# Patient Record
Sex: FEMALE | Race: WHITE
[De-identification: names, ages, dates, MRNs, and addresses within clinical notes are randomized per-mention and may not be internally consistent; named-entity substitution may affect disease eponyms.]

---

## 2023-02-05 ENCOUNTER — HOSPITAL ENCOUNTER (OUTPATIENT)
Dept: HOSPITAL 33 - OB | Age: 28
Setting detail: OBSERVATION
LOS: 1 days | Discharge: HOME | End: 2023-02-06
Attending: OBSTETRICS & GYNECOLOGY | Admitting: OBSTETRICS & GYNECOLOGY
Payer: COMMERCIAL

## 2023-02-05 DIAGNOSIS — Z34.83: Primary | ICD-10-CM

## 2023-02-05 DIAGNOSIS — Z3A.31: ICD-10-CM

## 2023-02-05 PROCEDURE — G0378 HOSPITAL OBSERVATION PER HR: HCPCS

## 2023-02-06 VITALS — HEART RATE: 83 BPM | DIASTOLIC BLOOD PRESSURE: 65 MMHG | SYSTOLIC BLOOD PRESSURE: 137 MMHG

## 2023-02-06 VITALS — OXYGEN SATURATION: 99 %

## 2023-03-31 ENCOUNTER — HOSPITAL ENCOUNTER (INPATIENT)
Dept: HOSPITAL 33 - OB | Age: 28
LOS: 1 days | Discharge: HOME | End: 2023-04-01
Attending: OBSTETRICS & GYNECOLOGY | Admitting: OBSTETRICS & GYNECOLOGY
Payer: COMMERCIAL

## 2023-03-31 DIAGNOSIS — O34.211: Primary | ICD-10-CM

## 2023-03-31 DIAGNOSIS — Z20.828: ICD-10-CM

## 2023-03-31 DIAGNOSIS — Z3A.39: ICD-10-CM

## 2023-03-31 LAB
ABO GROUP BLD: (no result)
AMPHETAMINES UR QL: NEGATIVE
APTT PPP: 26.3 SECONDS (ref 25.1–36.5)
BACTERIA UR CULT: NO
BARBITURATES UR QL: NEGATIVE
BENZODIAZ UR QL SCN: NEGATIVE
COCAINE UR QL SCN: NEGATIVE
HCT VFR BLD AUTO: 33.2 % (ref 35–47)
HGB BLD-MCNC: 11.2 G/DL (ref 12–16)
INR PPP: 0.89 (ref 0.8–3)
MCH RBC QN AUTO: 29.2 PG (ref 26–32)
MCHC RBC AUTO-ENTMCNC: 33.7 G/DL (ref 32–36)
METHADONE UR QL: NEGATIVE
OPIATES UR QL: NEGATIVE
PCP UR QL CFM>20 NG/ML: NEGATIVE
PLATELET # BLD AUTO: 153 X10^3/UL (ref 150–450)
PROTHROMBIN TIME: 9.8 SECONDS (ref 9.4–12.5)
RBC # BLD AUTO: 3.84 X10^6/UL (ref 4.1–5.4)
RBC # URNS HPF: (no result) /HPF (ref 0–5)
RBC # URNS HPF: (no result) /HPF (ref 0–5)
RH BLD: POSITIVE
THC UR QL SCN: NEGATIVE
WBC # BLD AUTO: 6.1 X10^3/UL (ref 4–10.5)
WBC URNS QL MICRO: (no result) /HPF (ref 0–5)
WBC URNS QL MICRO: (no result) /HPF (ref 0–5)

## 2023-03-31 PROCEDURE — 85730 THROMBOPLASTIN TIME PARTIAL: CPT

## 2023-03-31 PROCEDURE — 81002 URINALYSIS NONAUTO W/O SCOPE: CPT

## 2023-03-31 PROCEDURE — 36415 COLL VENOUS BLD VENIPUNCTURE: CPT

## 2023-03-31 PROCEDURE — 94799 UNLISTED PULMONARY SVC/PX: CPT

## 2023-03-31 PROCEDURE — 85610 PROTHROMBIN TIME: CPT

## 2023-03-31 PROCEDURE — 86850 RBC ANTIBODY SCREEN: CPT

## 2023-03-31 PROCEDURE — 86900 BLOOD TYPING SEROLOGIC ABO: CPT

## 2023-03-31 PROCEDURE — 87086 URINE CULTURE/COLONY COUNT: CPT

## 2023-03-31 PROCEDURE — 90715 TDAP VACCINE 7 YRS/> IM: CPT

## 2023-03-31 PROCEDURE — 80307 DRUG TEST PRSMV CHEM ANLYZR: CPT

## 2023-03-31 PROCEDURE — 85027 COMPLETE CBC AUTOMATED: CPT

## 2023-03-31 PROCEDURE — 96372 THER/PROPH/DIAG INJ SC/IM: CPT

## 2023-03-31 PROCEDURE — 76937 US GUIDE VASCULAR ACCESS: CPT

## 2023-03-31 PROCEDURE — 85025 COMPLETE CBC W/AUTO DIFF WBC: CPT

## 2023-03-31 PROCEDURE — 81001 URINALYSIS AUTO W/SCOPE: CPT

## 2023-03-31 PROCEDURE — 86901 BLOOD TYPING SEROLOGIC RH(D): CPT

## 2023-03-31 PROCEDURE — 64488 TAP BLOCK BI INJECTION: CPT

## 2023-03-31 PROCEDURE — 59426 ANTEPARTUM CARE ONLY: CPT

## 2023-03-31 PROCEDURE — 87340 HEPATITIS B SURFACE AG IA: CPT

## 2023-03-31 RX ADMIN — CLINDAMYCIN IN 5 PERCENT DEXTROSE SCH MLS/HR: 18 INJECTION, SOLUTION INTRAVENOUS at 18:17

## 2023-03-31 RX ADMIN — DOCUSATE SODIUM SCH MG: 100 CAPSULE, LIQUID FILLED ORAL at 22:23

## 2023-04-01 VITALS — HEART RATE: 119 BPM | SYSTOLIC BLOOD PRESSURE: 124 MMHG | OXYGEN SATURATION: 96 % | DIASTOLIC BLOOD PRESSURE: 78 MMHG

## 2023-04-01 LAB
BASOPHILS # BLD AUTO: 0.03 X10^3/UL (ref 0–0.4)
BASOPHILS NFR BLD AUTO: 0.3 % (ref 0–0.4)
EOSINOPHIL # BLD AUTO: 0.04 X10^3/UL (ref 0–0.5)
HCT VFR BLD AUTO: 33 % (ref 35–47)
HGB BLD-MCNC: 11.1 G/DL (ref 12–16)
IMM GRANULOCYTES # BLD: 0.03 X10^3U/L (ref 0–0.03)
IMM GRANULOCYTES NFR BLD: 0.3 % (ref 0–0.4)
LYMPHOCYTES # SPEC AUTO: 0.96 X10^3/UL (ref 1–4.6)
MCH RBC QN AUTO: 29.4 PG (ref 26–32)
MCHC RBC AUTO-ENTMCNC: 33.6 G/DL (ref 32–36)
MONOCYTES # BLD AUTO: 0.6 X10^3/UL (ref 0–1.3)
NRBC # BLD AUTO: 0 X10^3U/L (ref 0–0.01)
NRBC BLD AUTO-RTO: 0 % (ref 0–0.1)
PLATELET # BLD AUTO: 156 X10^3/UL (ref 150–450)
RBC # BLD AUTO: 3.77 X10^6/UL (ref 4.1–5.4)
WBC # BLD AUTO: 8.7 X10^3/UL (ref 4–10.5)

## 2023-04-01 RX ADMIN — HYDROCODONE BITARTRATE AND ACETAMINOPHEN PRN TAB: 5; 325 TABLET ORAL at 17:23

## 2023-04-01 RX ADMIN — CLINDAMYCIN IN 5 PERCENT DEXTROSE SCH MLS/HR: 18 INJECTION, SOLUTION INTRAVENOUS at 02:12

## 2023-04-01 RX ADMIN — HYDROCODONE BITARTRATE AND ACETAMINOPHEN PRN TAB: 5; 325 TABLET ORAL at 13:21

## 2023-04-01 RX ADMIN — DOCUSATE SODIUM SCH MG: 100 CAPSULE, LIQUID FILLED ORAL at 09:18

## 2023-04-01 NOTE — PCM.NOTE
Date and Time: 23  1642





Subjective Assessment: 





pod 1 sp csection


pt resting in bed and doing well able to ambulate and tolerate diet


vss afebrile


abd; soft


uterus firm


lochia; mild





a/p sp csection pod 1


  doing well


  desires to be discharged today due to baby being transferred





OBJECTIVE DATA


Vital Signs: 


                               Vital Signs - 24 hr











  Temp Pulse Resp BP Pulse Ox


 


 23 15:00  98.4 F  108 H  18  121/56  98


 


 23 10:00      97


 


 23 09:32  97.9 F  93 H  16  117/66  97


 


 23 09:00      97


 


 23 08:00      98


 


 23 07:00      100


 


 23 06:00  98.3 F  93 H  18  117/63  100


 


 23 05:00      100


 


 23 04:00      100


 


 23 03:00      100


 


 23 00:08  98.4 F  91 H  16  117/63  98


 


 23 00:00      98


 


 23 23:30      98


 


 23 22:00      98


 


 23 21:00      98


 


 23 20:00  98.6 F  96 H  18  110/53  99


 


 23 18:54      97


 


 23 18:00      99


 


 23 17:00      100








                        Pain Assessment - Last Documented











Pain Intensity [Bilateral      1





Lower Distal]                  


 


Pain Intensity                 7


 


Pain Scale Used                0-10 Pain Scale











Intake and Output: 


                                 Intake & Output











 23





 11:59 11:59 11:59 11:59


 


Intake Total   1620 


 


Output Total   2350 


 


Balance   -730 


 


Weight  129.274 kg  











Lab Results: 


                            Lab Results-Last 24 Hours











  23 Range/Units





  06:01 06:05 


 


WBC   8.7  (4.0-10.5)  x10^3/uL


 


RBC   3.77 L  (4.1-5.4)  x10^6/uL


 


Hgb   11.1 L  (12.0-16.0)  g/dL


 


Hct   33.0 L  (35-47)  %


 


MCV   87.5  ()  fL


 


MCH   29.4  (26-32)  pg


 


MCHC   33.6  (32-36)  g/dL


 


RDW   15.4 H  (11.5-14.0)  %


 


Plt Count   156  (150-450)  x10^3/uL


 


MPV   9.9  (7.5-11.0)  fL


 


Gran %   81.0 H  (36.0-66.0)  %


 


Immature Gran % (Auto)   0.3  (0.00-0.4)  %


 


Nucleat RBC Rel Count   0.0  (0.00-0.1)  %


 


Eos # (Auto)   0.04  (0-0.5)  x10^3/uL


 


Immature Gran # (Auto)   0.03  (0.00-0.03)  x10^3u/L


 


Absolute Lymphs (auto)   0.96 L  (1.0-4.6)  x10^3/uL


 


Absolute Monos (auto)   0.60  (0.0-1.3)  x10^3/uL


 


Absolute Nucleated RBC   0.00  (0.00-0.01)  x10^3u/L


 


Lymphocytes %   11.0 L  (24.0-44.0)  %


 


Monocytes %   6.9  (0.0-12.0)  %


 


Eosinophils %   0.5  (0.00-5.0)  %


 


Basophils %   0.3  (0.0-0.4)  %


 


Absolute Granulocytes   7.08 H  (1.4-6.9)  x10^3/uL


 


Basophils #   0.03  (0-0.4)  x10^3/uL


 


Hep Bs Antigen  Negative   (Negative)  














Assessment/Plan


(1) S/P repeat low transverse 


Current Visit: Yes   Status: Acute   Code(s): Z98.891 - HISTORY OF UTERINE SCAR 

FROM PREVIOUS SURGERY

## 2023-04-01 NOTE — PCM.DS
Discharge Summary


Date of Admission: 


23 05:00





Admitting Physician: 


JAMIE GUARDADO DO





Consults: 





                                Consults on Case





23 05:00


Notify Physician OF ADMISSION 





23 08:00


Notify  Anesthesia Provider ROUTINE 





23 10:00


Notify  Anesthesia Provider PRN 





23 10:28


 Navigation ONCE 











Primary Care Provider: 


IFRAH RETANA








Allergies


Allergies





Penicillins Allergy (Severe, Verified 23 06:26)


   Tightness of Throat











Hospital Summary





- Hospital Course


Hospital Course: 





pt admitted o  for scheduled repeat csection and underwent procedure 

without complication and delivered live baby girl.  pt did well postoperatively 

was able to ambulate and tolerate diet.  pts baby was transferred to [place] today

therefore pt desires to be discharged.  pt had stable hgb level at 11 and was 

given rx for norco for pain management. all questions answered to her 

satisfaction.  advised to fu next friday for incision check.





- Vitals & Intake/Output


Vital Signs: 





                                   Vital Signs











Temperature  98.4 F   23 15:00


 


Pulse Rate  108 H  23 15:00


 


Respiratory Rate  18   23 15:00


 


Blood Pressure  121/56   23 15:00


 


O2 Sat by Pulse Oximetry  98   23 15:00











Intake & Output: 





                                 Intake & Output











 23





 11:59 11:59 11:59 11:59


 


Intake Total   1620 


 


Output Total   2350 


 


Balance   -730 


 


Weight  129.274 kg  














- Lab


Result Diagrams: 


                                 23 06:05





Lab Results-Last 24 Hrs: 





                            Lab Results-Last 24 Hours











  23 Range/Units





  06:01 06:05 


 


WBC   8.7  (4.0-10.5)  x10^3/uL


 


RBC   3.77 L  (4.1-5.4)  x10^6/uL


 


Hgb   11.1 L  (12.0-16.0)  g/dL


 


Hct   33.0 L  (35-47)  %


 


MCV   87.5  ()  fL


 


MCH   29.4  (26-32)  pg


 


MCHC   33.6  (32-36)  g/dL


 


RDW   15.4 H  (11.5-14.0)  %


 


Plt Count   156  (150-450)  x10^3/uL


 


MPV   9.9  (7.5-11.0)  fL


 


Gran %   81.0 H  (36.0-66.0)  %


 


Immature Gran % (Auto)   0.3  (0.00-0.4)  %


 


Nucleat RBC Rel Count   0.0  (0.00-0.1)  %


 


Eos # (Auto)   0.04  (0-0.5)  x10^3/uL


 


Immature Gran # (Auto)   0.03  (0.00-0.03)  x10^3u/L


 


Absolute Lymphs (auto)   0.96 L  (1.0-4.6)  x10^3/uL


 


Absolute Monos (auto)   0.60  (0.0-1.3)  x10^3/uL


 


Absolute Nucleated RBC   0.00  (0.00-0.01)  x10^3u/L


 


Lymphocytes %   11.0 L  (24.0-44.0)  %


 


Monocytes %   6.9  (0.0-12.0)  %


 


Eosinophils %   0.5  (0.00-5.0)  %


 


Basophils %   0.3  (0.0-0.4)  %


 


Absolute Granulocytes   7.08 H  (1.4-6.9)  x10^3/uL


 


Basophils #   0.03  (0-0.4)  x10^3/uL


 


Hep Bs Antigen  Negative   (Negative)  














- Procedures and Test


Procedures and Tests throughout Hospitalization: 





                            Therapy Orders & Screens





23 09:16


[facility] ROUTINE 


   Comment: 


   Diagnosis: OSMAN Repeat C/S














- Discharge


Disposition: Home, Self-Care


Condition: Stable


Prescriptions: 


New


   Hydrocodone/Acetaminophen [Hydrocodone-Acetamin 5-325 mg***] 1 tab PO Q6HPRN 

PRN #20 tablet MDD 4


     PRN Reason: Pain





No Action


   Sertraline HCl 50 mg** [Zoloft 50 mg Tablet**] 50 mg PO DAILY


   Prenatal Vit No.179/Iron/Folic [Prenatal Tablet] 1 tab PO HS


Follow up with: 


IFRAH RETANA [Primary Care Provider] - 


JAMIE GUARDADO DO [ACTIVE STAFF] - 1 Week (should fu in office next friday)

## 2023-04-03 NOTE — OP
SURGERY DATE/TIME:  2023  0821  



PREOPERATIVE DIAGNOSIS:  Intrauterine pregnancy at 39 weeks gestation with previous 
 section for repeat  section. 



POSTOPERATIVE DIAGNOSIS:   Intrauterine pregnancy at 39 weeks gestation with previous 
 section for repeat  section with lower uterine segment with anterior 
placenta.  



PROCEDURES:   Repeat  section, low flap transverse uterine incision, Pfannenstiel 
skin incision. 



SURGEON:        Michelet Medina D.O.



ASSISTANT:      Quyen Beaver and Alsysa Carrera, surgical tech. 



ANESTHESIA:    Spinal. 



ESTIMATED BLOOD LOSS:  554 cc. 



COMPLICATIONS:  None.



INDICATIONS:  The risks, benefits, indications and alternatives of the procedure were 
reviewed with the patient prior to procedure. The patient understood the risk of 
infection, bleeding, bowel injury, bladder injury, ureteral perforation, pelvic infection, 
thromboembolic disorder associated with this procedure and desires to have this surgery as 
a possible means to alleviate her current medical condition. 

      

DESCRIPTION OF PROCEDURE AND FINDINGS:  At this point the patient is taken to the 
operating room where her spinal anesthesia was found to adequate. She was then prepared 
and draped in normal sterile fashion in the dorsal supine position with a leftward tilt. A 
Pfannenstiel skin incision is made with a scalpel and carried through to the underlying 
layer of the fascia with a Bovie. The fascia was then incised in the midline and the 
incision extended laterally with Araujo scissors. The superior aspect of the fascial 
incision were then grasped Kocher clamps elevated and the underlying rectus muscles 
dissected off bluntly. Attention is then turned to the inferior aspect of this incision 
which in similar fashion was grasped, tented up with Kocher clamps and the rectus muscles 
dissected off bluntly. The rectus muscles were then  in the midline and the 
peritoneum identified, tented up and entered sharply with Metzenbaum scissors. The 
peritoneal incision was then extended superiorly and inferiorly with good visualization of 
the bladder. The bladder blade was then inserted and the vesicouterine peritoneum 
identified, grasped with pickups and entered sharply with Metzenbaum scissors. This 
incision was then extended laterally and bladder flap created digitally. The bladder blade 
was then reinserted in the lower uterine segment and excised in transverse fashion with a 
scalpel. The uterine incision was then extended laterally with bandage scissors. The 
bladder blade was then removed and the placenta was noted to be anteriorly however 
entrance through the placenta was obtained and the infants head was delivered 
atraumatically. The nose and mouth were suctioned with DeLee suction and the cord clamped 
and cut. The infant was then handed off to the awaiting nurses. The lower uterine segment 
appeared to be thin at the time of entry. At this point the placenta was then removed and 
the uterus exteriorized and cleared of all clots and debris. The uterine incision was 
repaired with 1-0 chromic in running locked fashion. A second layer of the same suture was 
used to obtain excellent hemostasis. At this point the uterus is then returned to the 
abdomen. The gutters were cleared of all clots and the peritoneal muscles were closed in 
interrupted fashion using 2-0 chromic suture. The fascia was re-approximated with 0 Vicryl 
in running fashion. The subcutaneous layer was closed with 3-0 Vicryl suture. The skin was 
closed with absorbable staples called INSORB. The patient tolerated the procedure well.  
Sponge, lap, needle and instrument counts were correct x2.  The patient was then taken to 
the recovery room in stable condition. The patient delivered a live baby girl at 0957 
without complication.

## 2023-08-29 ENCOUNTER — HOSPITAL ENCOUNTER (EMERGENCY)
Dept: HOSPITAL 33 - ED | Age: 28
Discharge: HOME | End: 2023-08-29
Payer: COMMERCIAL

## 2023-08-29 VITALS — OXYGEN SATURATION: 98 %

## 2023-08-29 VITALS — SYSTOLIC BLOOD PRESSURE: 133 MMHG | DIASTOLIC BLOOD PRESSURE: 84 MMHG | HEART RATE: 92 BPM

## 2023-08-29 VITALS — RESPIRATION RATE: 24 BRPM | TEMPERATURE: 98.2 F

## 2023-08-29 DIAGNOSIS — R10.2: Primary | ICD-10-CM

## 2023-08-29 LAB
ALBUMIN SERPL-MCNC: 4.3 G/DL (ref 3.5–5)
ALP SERPL-CCNC: 57 U/L (ref 38–126)
ALT SERPL-CCNC: 18 U/L (ref 0–35)
ANION GAP SERPL CALC-SCNC: 13.6 MEQ/L (ref 5–15)
AST SERPL QL: 22 U/L (ref 14–36)
BACTERIA UR CULT: NO
BASOPHILS # BLD AUTO: 0.03 X10^3/UL (ref 0–0.4)
BASOPHILS NFR BLD AUTO: 0.5 % (ref 0–0.4)
BILIRUB BLD-MCNC: 0.4 MG/DL (ref 0.2–1.3)
BUN SERPL-MCNC: 8 MG/DL (ref 7–17)
C TRACH DNA SPEC QL NAA+PROBE: NOT DETECTED
CALCIUM SPEC-MCNC: 9.1 MG/DL (ref 8.4–10.2)
CANDIDA DNA SPEC QL NAA+PROBE: NOT DETECTED
CHLORIDE SERPL-SCNC: 105 MMOL/L (ref 98–107)
CO2 SERPL-SCNC: 25 MMOL/L (ref 22–30)
CREAT SERPL-MCNC: 0.56 MG/DL (ref 0.52–1.04)
EOSINOPHIL # BLD AUTO: 0.09 X10^3/UL (ref 0–0.5)
GFR SERPLBLD BASED ON 1.73 SQ M-ARVRAT: > 60 ML/MIN
GLUCOSE SERPL-MCNC: 97 MG/DL (ref 74–106)
HCG UR QL: NEGATIVE
HCT VFR BLD AUTO: 37.3 % (ref 35–47)
HGB BLD-MCNC: 12.4 G/DL (ref 12–16)
IMM GRANULOCYTES # BLD: 0.02 X10^3U/L (ref 0–0.03)
IMM GRANULOCYTES NFR BLD: 0.3 % (ref 0–0.4)
LYMPHOCYTES # SPEC AUTO: 1.51 X10^3/UL (ref 1–4.6)
MCH RBC QN AUTO: 27.5 PG (ref 26–32)
MCHC RBC AUTO-ENTMCNC: 33.2 G/DL (ref 32–36)
MONOCYTES # BLD AUTO: 0.34 X10^3/UL (ref 0–1.3)
NRBC # BLD AUTO: 0 X10^3U/L (ref 0–0.01)
NRBC BLD AUTO-RTO: 0 % (ref 0–0.1)
PLATELET # BLD AUTO: 206 X10^3/UL (ref 150–450)
POTASSIUM SERPLBLD-SCNC: 3.6 MMOL/L (ref 3.5–5.1)
PROT SERPL-MCNC: 7.5 G/DL (ref 6.3–8.2)
RBC # BLD AUTO: 4.51 X10^6/UL (ref 4.1–5.4)
RBC # URNS HPF: (no result) /HPF (ref 0–5)
SODIUM SERPL-SCNC: 140 MMOL/L (ref 137–145)
WBC # BLD AUTO: 6.3 X10^3/UL (ref 4–10.5)
WBC URNS QL MICRO: (no result) /HPF (ref 0–5)

## 2023-08-29 PROCEDURE — 36415 COLL VENOUS BLD VENIPUNCTURE: CPT

## 2023-08-29 PROCEDURE — 87801 DETECT AGNT MULT DNA AMPLI: CPT

## 2023-08-29 PROCEDURE — 87491 CHLMYD TRACH DNA AMP PROBE: CPT

## 2023-08-29 PROCEDURE — 36000 PLACE NEEDLE IN VEIN: CPT

## 2023-08-29 PROCEDURE — 87591 N.GONORRHOEAE DNA AMP PROB: CPT

## 2023-08-29 PROCEDURE — 99283 EMERGENCY DEPT VISIT LOW MDM: CPT

## 2023-08-29 PROCEDURE — 87481 CANDIDA DNA AMP PROBE: CPT

## 2023-08-29 PROCEDURE — 87661 TRICHOMONAS VAGINALIS AMPLIF: CPT

## 2023-08-29 PROCEDURE — 81001 URINALYSIS AUTO W/SCOPE: CPT

## 2023-08-29 PROCEDURE — 81025 URINE PREGNANCY TEST: CPT

## 2023-08-29 PROCEDURE — 85025 COMPLETE CBC W/AUTO DIFF WBC: CPT

## 2023-08-29 PROCEDURE — 80053 COMPREHEN METABOLIC PANEL: CPT

## 2023-08-29 NOTE — ERPHSYRPT
- History of Present Illness


Time Seen by Provider: 23 18:58


Source: patient


Exam Limitations: no limitations


Physician History: 


Patient 27-year-old female presents to our ED for evaluation of pelvic pain.  

Patient states that she has been experiencing pelvic pain since May 18 when her 

IUD was placed.  Patient made her OB/GYN doctor aware and is currently scheduled

to follow-up with him regarding this discomfort that has been ongoing since May 

18.  Patient states now however she feels a pressure sensation in addition to 

the pain that she has been experiencing since the IUD was placed.  No vaginal 

discharge.  No trauma.  No fever.  No urinary symptomology.  Symptoms are mild 

to moderate in intensity.  No specific worsening improving factors.  No 

associated symptomology otherwise.  Patient is currently 5 months postpartum.  

Patient otherwise feels well.  She voices no other complaints or concerns at 

this time.














Portions of this note were created with voice recognition technology.  There may

be grammatical, spelling, punctuation or sound alike errors





Timing/Duration: day(s) (2 days)


Severity: moderate


Modifying Factors: Improves With: nothing


Associated Symptoms: denies symptoms


Allergies/Adverse Reactions: 








Penicillins Allergy (Severe, Verified 23 18:46)


   Tightness of Throat





Home Medications: 








No Reportable Medications [No Reported Medications]  23 [History]








Travel Risk





- Vaccine Status


Have you recieved a Covid-19 vaccination: Yes


: Partnered





- Review of Systems


Constitutional: No Symptoms, No Fever, No Chills


Eyes: No Symptoms


Ears, Nose, & Throat: No Symptoms


Respiratory: No Symptoms, No Cough, No Dyspnea


Cardiac: No Symptoms, No Chest Pain, No Edema, No Syncope


Abdominal/Gastrointestinal: No Symptoms, No Abdominal Pain, No Nausea, No 

Vomiting, No Diarrhea


Genitourinary Symptoms: No Symptoms, No Dysuria


Musculoskeletal: No Symptoms, No Back Pain, No Neck Pain


Skin: No Symptoms, No Rash


Neurological: No Symptoms, No Dizziness, No Focal Weakness, No Sensory Changes


Psychological: No Symptoms


Endocrine: No Symptoms


Hematologic/Lymphatic: No Symptoms


Immunological/Allergic: No Symptoms


All Other Systems: Reviewed and Negative





- Past Medical History


Pertinent Past Medical History: Yes


Neurological History: No Pertinent History


ENT History: No Pertinent History


Cardiac History: No Pertinent History


Respiratory History: No Pertinent History


Endocrine Medical History: No Pertinent History


Musculoskeletal History: Fractures


GI Medical History: No Pertinent History


 History: No Pertinent History


Psycho-Social History: Anxiety, Depression


Female Reproductive Disorders: No Pertinent History





- Past Surgical History


Past Surgical History: Yes


Neuro Surgical History: No Pertinent History


Cardiac: No Pertinent History


Respiratory: No Pertinent History


Gastrointestinal: Cholecystectomy


Genitourinary: No Pertinent History


Musculoskeletal: No Pertinent History


Female Surgical History: Dilation & Curettage,  Section


Other Surgical History: Primary c/s 2019





- Social History


Smoking Status: Never smoker


Exposure to second hand smoke: No


Drug Use: none





- Nursing Vital Signs


Nursing Vital Signs: 


                               Initial Vital Signs











Temperature  98.2 F   23 18:37


 


Pulse Rate  102 H  23 18:37


 


Respiratory Rate  24   23 18:37


 


Blood Pressure  142/89   23 18:37


 


O2 Sat by Pulse Oximetry  98   23 18:37








                                   Pain Scale











Pain Intensity                 4

















- Physical Exam


General Appearance: no apparent distress, alert


Eye Exam: PERRL/EOMI, eyes nml inspection


Ears, Nose, Throat Exam: normal ENT inspection, TMs normal, pharynx normal, 

moist mucous membranes


Neck Exam: normal inspection, non-tender, supple, full range of motion


Respiratory Exam: normal breath sounds, lungs clear, airway intact, No 

respiratory distress


Cardiovascular Exam: regular rate/rhythm, normal heart sounds, normal peripheral

 pulses


Gastrointestinal/Abdomen Exam: soft, normal bowel sounds, No tenderness, No mass


Pelvic Exam: normal external exam, No adnexal tenderness, No adnexal mass, No 

cervical motion tenderness, No vaginal bleeding, No vaginal discharge


Back Exam: normal inspection, normal range of motion, No CVA tenderness, No 

vertebral tenderness


Extremity Exam: normal inspection, normal range of motion, pelvis stable


Neurologic Exam: alert, oriented x 3, cooperative, normal mood/affect, nml 

cerebellar function, nml station & gait, sensation nml, No motor deficits


Skin Exam: normal color, warm, dry, No rash


Lymphatic Exam: No adenopathy


**SpO2 Interpretation**: normal


SpO2: 98


O2 Delivery: Room Air





- Course


Nursing assessment & vital signs reviewed: Yes


Ordered Tests: 


                               Active Orders 24 hr











 Category Date Time Status


 


 IV Insertion STAT Care  23 18:55 Active


 


 CBC W DIFF Stat Lab  23 19:25 Completed


 


 CMP Stat Lab  23 19:25 Completed


 


 HCG QUALITATIVE, URINE Stat Lab  23 19:25 Completed


 


 UA W/RFX UR CULTURE Stat Lab  23 19:25 Completed











Lab/Rad Data: 


                           Laboratory Result Diagrams





                                 23 19:25 





                                 23 19:25 





                               Laboratory Results











  23 Range/Units





  20:00 19:25 19:25 


 


WBC     (4.0-10.5)  x10^3/uL


 


RBC     (4.1-5.4)  x10^6/uL


 


Hgb     (12.0-16.0)  g/dL


 


Hct     (35-47)  %


 


MCV     ()  fL


 


MCH     (26-32)  pg


 


MCHC     (32-36)  g/dL


 


RDW     (11.5-14.0)  %


 


Plt Count     (150-450)  x10^3/uL


 


MPV     (7.5-11.0)  fL


 


Gran %     (36.0-66.0)  %


 


Immature Gran % (Auto)     (0.00-0.4)  %


 


Nucleat RBC Rel Count     (0.00-0.1)  %


 


Eos # (Auto)     (0-0.5)  x10^3/uL


 


Immature Gran # (Auto)     (0.00-0.03)  x10^3u/L


 


Absolute Lymphs (auto)     (1.0-4.6)  x10^3/uL


 


Absolute Monos (auto)     (0.0-1.3)  x10^3/uL


 


Absolute Nucleated RBC     (0.00-0.01)  x10^3u/L


 


Lymphocytes %     (24.0-44.0)  %


 


Monocytes %     (0.0-12.0)  %


 


Eosinophils %     (0.00-5.0)  %


 


Basophils %     (0.0-0.4)  %


 


Absolute Granulocytes     (1.4-6.9)  x10^3/uL


 


Basophils #     (0-0.4)  x10^3/uL


 


Sodium     (137-145)  mmol/L


 


Potassium     (3.5-5.1)  mmol/L


 


Chloride     ()  mmol/L


 


Carbon Dioxide     (22-30)  mmol/L


 


Anion Gap     (5-15)  MEQ/L


 


BUN     (7-17)  mg/dL


 


Creatinine     (0.52-1.04)  mg/dL


 


Estimated GFR     ML/MIN


 


Glucose     ()  mg/dL


 


Calcium     (8.4-10.2)  mg/dL


 


Total Bilirubin     (0.2-1.3)  mg/dL


 


AST     (14-36)  U/L


 


ALT     (0-35)  U/L


 


Alkaline Phosphatase     ()  U/L


 


Serum Total Protein     (6.3-8.2)  g/dL


 


Albumin     (3.5-5.0)  g/dL


 


Urine Color    Yellow  (Yellow)  


 


Urine Appearance    Clear  (Clear)  


 


Urine pH    7.5  (4.6-8.0)  


 


Ur Specific Gravity    1.015  (1.005-1.030)  


 


Urine Protein    Negative  (Negative)  


 


Urine Glucose (UA)    Negative  (Negative)  mg/dL


 


Urine Ketones    Negative  (Negative)  


 


Urine Blood    Negative  (Negative)  


 


Urine Nitrite    Negative  (Negative)  


 


Urine Bilirubin    Negative  (Negative)  


 


Urine Urobilinogen    0.2  (0.2)  mg/dL


 


Ur Leukocyte Esterase    Negative  (Negative)  


 


U Hyaline Cast (Auto)    NONE SEEN  (0-2)  /LPF


 


Urine Microscopic RBC    0-2  (0-5)  /HPF


 


Urine Microscopic WBC    0-2  (0-5)  /HPF


 


Ur Epithelial Cells    None Seen  (None Seen)  /HPF


 


Urine Bacteria    None Seen  (None Seen)  /HPF


 


Urine Culture Reflexed    NO  (NO)  


 


Urine HCG, Qual   NEGATIVE   (NEGATIVE)  


 


Vaginal Candida Group  NOT DETECTED    (NEGATIVE)  


 


Candida species  NOT DETECTED    (NEGATIVE)  


 


Chlamydia DNA Probe     (NEGATIVE)  


 


N.gonorrhoeae DNA Probe     (NEGATIVE)  


 


T. vaginalis (PCR)  NOT DETECTED    (NEGATIVE)  


 


Bact Vaginosis (PCR)  NEGATIVE    (NEGATIVE)  














  23 Range/Units





  19:25 19:25 18:37 


 


WBC   6.3   (4.0-10.5)  x10^3/uL


 


RBC   4.51   (4.1-5.4)  x10^6/uL


 


Hgb   12.4   (12.0-16.0)  g/dL


 


Hct   37.3   (35-47)  %


 


MCV   82.7   ()  fL


 


MCH   27.5   (26-32)  pg


 


MCHC   33.2   (32-36)  g/dL


 


RDW   13.5   (11.5-14.0)  %


 


Plt Count   206   (150-450)  x10^3/uL


 


MPV   9.1   (7.5-11.0)  fL


 


Gran %   68.3 H   (36.0-66.0)  %


 


Immature Gran % (Auto)   0.3   (0.00-0.4)  %


 


Nucleat RBC Rel Count   0.0   (0.00-0.1)  %


 


Eos # (Auto)   0.09   (0-0.5)  x10^3/uL


 


Immature Gran # (Auto)   0.02   (0.00-0.03)  x10^3u/L


 


Absolute Lymphs (auto)   1.51   (1.0-4.6)  x10^3/uL


 


Absolute Monos (auto)   0.34   (0.0-1.3)  x10^3/uL


 


Absolute Nucleated RBC   0.00   (0.00-0.01)  x10^3u/L


 


Lymphocytes %   24.1   (24.0-44.0)  %


 


Monocytes %   5.4   (0.0-12.0)  %


 


Eosinophils %   1.4   (0.00-5.0)  %


 


Basophils %   0.5   (0.0-0.4)  %


 


Absolute Granulocytes   4.28   (1.4-6.9)  x10^3/uL


 


Basophils #   0.03   (0-0.4)  x10^3/uL


 


Sodium  140    (137-145)  mmol/L


 


Potassium  3.6    (3.5-5.1)  mmol/L


 


Chloride  105    ()  mmol/L


 


Carbon Dioxide  25    (22-30)  mmol/L


 


Anion Gap  13.6    (5-15)  MEQ/L


 


BUN  8    (7-17)  mg/dL


 


Creatinine  0.56    (0.52-1.04)  mg/dL


 


Estimated GFR  > 60.0    ML/MIN


 


Glucose  97    ()  mg/dL


 


Calcium  9.1    (8.4-10.2)  mg/dL


 


Total Bilirubin  0.40    (0.2-1.3)  mg/dL


 


AST  22    (14-36)  U/L


 


ALT  18    (0-35)  U/L


 


Alkaline Phosphatase  57    ()  U/L


 


Serum Total Protein  7.5    (6.3-8.2)  g/dL


 


Albumin  4.3    (3.5-5.0)  g/dL


 


Urine Color     (Yellow)  


 


Urine Appearance     (Clear)  


 


Urine pH     (4.6-8.0)  


 


Ur Specific Gravity     (1.005-1.030)  


 


Urine Protein     (Negative)  


 


Urine Glucose (UA)     (Negative)  mg/dL


 


Urine Ketones     (Negative)  


 


Urine Blood     (Negative)  


 


Urine Nitrite     (Negative)  


 


Urine Bilirubin     (Negative)  


 


Urine Urobilinogen     (0.2)  mg/dL


 


Ur Leukocyte Esterase     (Negative)  


 


U Hyaline Cast (Auto)     (0-2)  /LPF


 


Urine Microscopic RBC     (0-5)  /HPF


 


Urine Microscopic WBC     (0-5)  /HPF


 


Ur Epithelial Cells     (None Seen)  /HPF


 


Urine Bacteria     (None Seen)  /HPF


 


Urine Culture Reflexed     (NO)  


 


Urine HCG, Qual     (NEGATIVE)  


 


Vaginal Candida Group     (NEGATIVE)  


 


Candida species     (NEGATIVE)  


 


Chlamydia DNA Probe    NOT DETECTED  (NEGATIVE)  


 


N.gonorrhoeae DNA Probe    NOT DETECTED  (NEGATIVE)  


 


T. vaginalis (PCR)     (NEGATIVE)  


 


Bact Vaginosis (PCR)     (NEGATIVE)  














- Progress


Progress: improved


Progress Note: 


Patient is a 27-year-old female presents to our ED for evaluation of pelvic 

pressure.  Physical exam essentially nonremarkable.  Pelvic exam was negative.  

CBC CMP ordered.  No significant findings observed.  GC chlamydia negative.  hCG

 negative.  Urinalysis negative.  Vaginal panel negative.  Wet prep negative as 

well.  Ultrasound is currently not in-house.  I spoke to Dr. Medina patient's 

OB/GYN physician who agreed that there is no need to call in ultrasound at this 

time.  Dr. Medina advised that patient call him in the morning for follow-up 

appointment.  Patient states that she has been having some pelvic discomfort 

since the placement of her IUD on May 18.  Patient believes that her current 

symptomology is related to her IUD but as of now no evidence that this is the 

case.











Complexity of problems addressed is moderate acute complicated








No critical care time








Complex of data reviewed and analyzed is extensive.  Labs ordered reviewed and 

analyzed.  Clinical correlation made between findings and history and physical 

examination.  Management discussed with patient's OB/GYN physician who agrees 

with plan of care/disposition.








Risk complication and or risk of morbidity/mortality of patient management is 

low.  No prescriptions prescribed.














Vital stable.  Discharge diagnosis is pelvic pain not otherwise specified.  Time

 spent to discharge patient is approximately 15 minutes.  Plan of care establi

shed for shared decision making.  No social determinants of health present to 

impede follow-up.  Patient agrees to call her OB/GYN physician in the morning 

for follow-up appointment.  She voices no other complaints concerns at this 

time.











Portions of this note were created with voice recognition technology.  There may

 be grammatical, spelling, punctuation or sound alike errors








23 21:20





Discussed with DrRaleigh: James


Counseled pt/family regarding: lab results, diagnosis, need for follow-up





- Departure


Departure Disposition: Home


Clinical Impression: 


 Pelvic pain





Condition: Stable


Critical Care Time: No


Referrals: 


FABIÁN TORRES MD [Primary Care Provider] - Follow up/PCP as directed


Additional Instructions: 


Discharge/Care Plan





EVERARDO WATKINS was seen on 23 in the Emergency Room. The patient 

was counseled regarding Diagnosis,Lab results, Imaging studies, need for follow 

up and when to return to the Emergency Room.





Prescriptions given:





Discharge Note





I have spoken with the patient and/or caregivers. I have explained the patient's

condition, diagnosis and treatment plan based on the information available to me

at this time. I have answered the patient's and/or caregiver's questions and 

addressed any concerns. The patient and/or caregivers have as good understanding

of the patient's diagnosis, condition and treatment plan as can be expected at 

this point. The vital signs have been stable. The patient's condition is stable 

and appropriate for discharge from the emergency department.





The patient will pursue further outpatient evaluation with the primary care 

physician or other designated or consulting physician as outlined in the 

discharge instructions. The patient and/or caregivers are agreeable to this plan

of care and follow-up instructions have been explained in detail. The patient 

and/or caregivers have received these instruction. The patient/and or caregivers

are aware that any significant change in condition or worsening of symptoms 

should prompt an immediate return to this or the closest emergency department or

call 911.

## 2024-09-25 ENCOUNTER — HOSPITAL ENCOUNTER (EMERGENCY)
Dept: HOSPITAL 33 - ED | Age: 29
Discharge: HOME | End: 2024-09-25
Payer: COMMERCIAL

## 2024-09-25 VITALS — OXYGEN SATURATION: 96 %

## 2024-09-25 VITALS — SYSTOLIC BLOOD PRESSURE: 123 MMHG | RESPIRATION RATE: 18 BRPM | DIASTOLIC BLOOD PRESSURE: 70 MMHG | HEART RATE: 86 BPM

## 2024-09-25 VITALS — TEMPERATURE: 97 F

## 2024-09-25 DIAGNOSIS — S83.91XA: Primary | ICD-10-CM
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
## 2024-09-25 PROCEDURE — 73562 X-RAY EXAM OF KNEE 3: CPT

## 2024-09-25 PROCEDURE — 99283 EMERGENCY DEPT VISIT LOW MDM: CPT

## 2024-09-25 NOTE — ERPHSYRPT
- History of Present Illness


Source: patient


Exam Limitations: no limitations


Hx Tetanus, Diphtheria Vaccination/Date Given: Yes


Hx Influenza Vaccination/Date Given: No


Hx Pneumococcal Vaccination/Date Given: No





<MITCHELL FREEMAN - Last Filed: 24 17:45>





- History of Present Illness


Method of Injury: fell, twisted


Occurred: just prior to arrival


Quality: constant


Severity of Pain-Max: moderate


Severity of Pain-Current: mild


Lower Extremities Pain: knee: right


Modifying Factors: Improves With: movement (Bearing)





<CHELLE LANIER - Last Filed: 24 20:19>





- History of Present Illness


Time Seen by Provider: 24 17:45


Physician History: 


28-year-old female presents to emergency department for evaluation of pain to 

the right knee.  Patient states she was at home in her yard when she stepped 

into a hole and fell over.  Injury occurred today just prior to arrival.  

Patient describes pain at the anterior aspect of her right knee.  Pain 

reproduced with palpation.  Pain also worse with ambulation/weightbearing.  No 

other injuries reported.  No BHT or LOC.  No neck pain.  Cervical spine cleared 

clinically.  Pain described as an ache that is localized however patient states 

pain is minimal and she declined pain medication.  Patient reports that she felt

a click sensation at the time of the injury.  No other complaints voiced.








Portions of this note were created with voice recognition technology.  There may

be grammatical, spelling, punctuation or sound alike errors


 (CHELLE LANIER)


Allergies/Adverse Reactions: 








Penicillins Allergy (Severe, Verified 24 18:32)


   Tightness of Throat





Home Medications: 








No Reportable Medications [No Reported Medications]  23 [History]








- Review of Systems


Constitutional: No Symptoms, No Fever, No Chills


Eyes: No Symptoms


Ears, Nose, & Throat: No Symptoms


Respiratory: No Symptoms, No Cough, No Dyspnea


Cardiac: No Symptoms, No Chest Pain, No Edema, No Syncope


Abdominal/Gastrointestinal: No Symptoms, No Abdominal Pain, No Nausea, No 

Vomiting, No Diarrhea


Genitourinary Symptoms: No Symptoms, No Dysuria


Musculoskeletal: No Symptoms, No Back Pain, No Neck Pain


Skin: No Symptoms, No Rash


Neurological: No Symptoms, No Dizziness, No Focal Weakness, No Sensory Changes


Psychological: No Symptoms


Endocrine: No Symptoms


Hematologic/Lymphatic: No Symptoms


Immunological/Allergic: No Symptoms


All Other Systems: Reviewed and Negative





<DWIGHTCHELLE - Last Filed: 24 20:19>





- Past Medical History


Pertinent Past Medical History: Yes


Neurological History: No Pertinent History


ENT History: No Pertinent History


Cardiac History: No Pertinent History


Respiratory History: No Pertinent History


Endocrine Medical History: No Pertinent History


Musculoskeletal History: Fractures


GI Medical History: Gallbladder Disease


 History: No Pertinent History


Psycho-Social History: Anxiety, Depression


Female Reproductive Disorders: No Pertinent History


Other Medical History: IUD





- Past Surgical History


Past Surgical History: Yes


Neuro Surgical History: No Pertinent History


Cardiac: No Pertinent History


Respiratory: No Pertinent History


Gastrointestinal: Cholecystectomy


Genitourinary: No Pertinent History


Musculoskeletal: No Pertinent History


Female Surgical History: Dilation & Curettage,  Section


Other Surgical History: Primary c/s  and 





- Social History


Smoking Status: Never smoker


Exposure to second hand smoke: Yes


Drug Use: none


Patient Lives Alone: No





<MITCHELL FREEMAN - Last Filed: 24 17:45>





- Physical Exam


General Appearance: alert


Eyes, Ears, Nose, Throat Exam: moist mucous membranes


Neck Exam: non-tender, supple


Cardiovascular/Respiratory Exam: chest non-tender, regular rate/rhythm, no 

respiratory distress


Gastrointestinal/Abdominal Exam: non-tender


Back Exam: normal inspection, No vertebral tenderness


Hips Exam: bilateral: non-tender, normal inspection, normal range of motion, no 

evidence of injury


Legs Exam: bilateral leg: non-tender, normal inspection, normal range of motion,

 no evidence of injury


Knees Exam: right knee: pain, soft tissue tenderness (Soft tissue tenderness 

anterior knee over the patellar tendon.  Extensor mechanism intact.), left knee:

 non-tender, normal inspection, normal range of motion, no evidence of injury


Ankle Exam: bilateral ankle: non-tender, normal inspection, normal range of 

motion, no evidence of injury


Foot Exam: bilateral foot: non-tender, normal inspection, normal range of 

motion, no evidence of injury


Neuro/Tendon Exam: normal sensation, normal motor functions


Mental Status Exam: alert, oriented x 3, cooperative


Skin Exam: normal color, warm, dry


SpO2: 96


O2 Delivery: Room Air





<CHELLE LANIER - Last Filed: 24 20:19>





- Nursing Vital Signs


Nursing Vital Signs: 


                               Initial Vital Signs











Temperature  97.0 F   24 18:37


 


Pulse Rate  95 H  24 18:37


 


Respiratory Rate  20   24 18:37


 


Blood Pressure  130/90   24 18:37


 


O2 Sat by Pulse Oximetry  96   24 18:37








                                   Pain Scale











Pain Intensity                 4

















- Course


Nursing assessment & vital signs reviewed: Yes





- Radiology Exams


  ** Knee


X-ray Interpretation: Interpreted by me (No fracture or dislocation.  No acute 

findings)





<CHELLE LANIER - Last Filed: 24 20:19>


Ordered Tests: 


                               Active Orders 24 hr











 Category Date Time Status


 


 KNEE (3 VIEWS) Stat Exams  24 18:31 Taken














- Progress


Progress: improved


Counseled pt/family regarding: diagnosis, need for follow-up, rad results





<CHELLE LANIER - Last Filed: 24 20:19>





- Progress


Progress Note: 


27-year-old female presents to emergency department for evaluation of  of 

anterior knee pain after stepping into a hole.  Physical exam reveals a 

superficial abrasion to the knee.


The involved EXTR neurovascular tact distally compartments are soft cap refill 

less than 2 seconds.  Extensor mechanism intact.  All knee ligaments are stable.

  Patient declined pain medication.  Patient however received bilateral axillary

 crutches and an Ace wrap.  Patient received a referral to orthopedic clinic for

 .  Patient otherwise feels well.  She states he is ready for 

discharge and voices no other complaints or concerns at this time.








Portions of this note were created with voice recognition technology.  There may

 be grammatical, spelling, punctuation or sound alike errors








Complexity of problem addressed is moderate acute complicated.  No critical care

 time.  Complex of data reviewed and analyzed is moderate.  Test ordered test 

reviewed Dr. Lanier independently reviewed the x-ray of the right knee.  Results 

analyzed and correlated clinically with history and physical exam.  Vital 

stable.  Time spent to discharge patient approximately 15 minutes.  Plan of care

 established for shared decision making.  No social determinants of health 

present to impede follow-up.











Portions of this note were created with voice recognition technology.  There may

 be grammatical, spelling, punctuation or sound alike errors














24 20:15





 (CHELLE LANIER)





<MITCHELL FREEMAN - Last Filed: 24 17:45>





- Departure


Departure Disposition: Home


Critical Care Time: No





<CHELLE LANIER - Last Filed: 24 20:19>





- Departure


Clinical Impression: 


 Right knee sprain, Fall





Condition: Stable


Referrals: 


FABIÁN TORRES MD [Primary Care Provider] - Follow up/PCP as directed


Additional Instructions: 


Discharge/Care Plan





EVERARDO WATKINS was seen on 24 in the Emergency Room. The patient 

was counseled regarding Diagnosis,Lab results, Imaging studies, need for follow 

up and when to return to the Emergency Room.





Prescriptions given:





Discharge Note





I have spoken with the patient and/or caregivers. I have explained the patient's

condition, diagnosis and treatment plan based on the information available to me

at this time. I have answered the patient's and/or caregiver's questions and 

addressed any concerns. The patient and/or caregivers have as good understanding

of the patient's diagnosis, condition and treatment plan as can be expected at 

this point. The vital signs have been stable. The patient's condition is stable 

and appropriate for discharge from the emergency department.





The patient will pursue further outpatient evaluation with the primary care 

physician or other designated or consulting physician as outlined in the 

discharge instructions. The patient and/or caregivers are agreeable to this plan

of care and follow-up instructions have been explained in detail. The patient 

and/or caregivers have received these instruction. The patient/and or caregivers

are aware that any significant change in condition or worsening of symptoms 

should prompt an immediate return to this or the closest emergency department or

call 911. 








Outpatient Orders: 


Ortho Referral Time Frame: 1 Day, Facility: [facility], 

Location: ORTHO CLINIC

## 2024-09-26 NOTE — XRAY
Indication: Pain following fall.



Comparison: None



4 view right knee obtained.  No bony, articular, or soft tissue abnormalities.

## 2025-04-03 ENCOUNTER — HOSPITAL ENCOUNTER (EMERGENCY)
Dept: HOSPITAL 33 - ED | Age: 30
Discharge: HOME | End: 2025-04-03
Payer: COMMERCIAL

## 2025-04-03 VITALS — TEMPERATURE: 98.9 F | OXYGEN SATURATION: 100 % | HEART RATE: 94 BPM | RESPIRATION RATE: 19 BRPM

## 2025-04-03 VITALS — DIASTOLIC BLOOD PRESSURE: 71 MMHG | SYSTOLIC BLOOD PRESSURE: 123 MMHG

## 2025-04-03 DIAGNOSIS — Z79.899: ICD-10-CM

## 2025-04-03 DIAGNOSIS — S93.401A: Primary | ICD-10-CM

## 2025-04-03 DIAGNOSIS — M79.671: ICD-10-CM
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
## 2025-04-03 PROCEDURE — 73630 X-RAY EXAM OF FOOT: CPT

## 2025-04-03 PROCEDURE — 99283 EMERGENCY DEPT VISIT LOW MDM: CPT

## 2025-04-03 PROCEDURE — 73610 X-RAY EXAM OF ANKLE: CPT

## 2025-04-03 RX ADMIN — IBUPROFEN ONE MG: 600 TABLET, FILM COATED ORAL at 06:34

## 2025-04-03 RX ADMIN — ACETAMINOPHEN STA MG: 500 TABLET ORAL at 06:33
